# Patient Record
Sex: FEMALE | Race: OTHER | NOT HISPANIC OR LATINO | URBAN - METROPOLITAN AREA
[De-identification: names, ages, dates, MRNs, and addresses within clinical notes are randomized per-mention and may not be internally consistent; named-entity substitution may affect disease eponyms.]

---

## 2018-08-24 ENCOUNTER — EMERGENCY (EMERGENCY)
Facility: HOSPITAL | Age: 33
LOS: 1 days | Discharge: HOME | End: 2018-08-24
Attending: EMERGENCY MEDICINE
Payer: MEDICAID

## 2018-08-24 VITALS
SYSTOLIC BLOOD PRESSURE: 92 MMHG | DIASTOLIC BLOOD PRESSURE: 61 MMHG | RESPIRATION RATE: 16 BRPM | OXYGEN SATURATION: 98 % | HEIGHT: 67 IN | HEART RATE: 75 BPM | WEIGHT: 123.9 LBS | TEMPERATURE: 98 F

## 2018-08-24 LAB
APPEARANCE UR: CLEAR — SIGNIFICANT CHANGE UP
BILIRUB UR-MCNC: NEGATIVE — SIGNIFICANT CHANGE UP
COLOR SPEC: YELLOW — SIGNIFICANT CHANGE UP
DIFF PNL FLD: ABNORMAL
GLUCOSE UR QL: NEGATIVE — SIGNIFICANT CHANGE UP
HCG UR QL: NEGATIVE — SIGNIFICANT CHANGE UP
KETONES UR-MCNC: NEGATIVE — SIGNIFICANT CHANGE UP
LEUKOCYTE ESTERASE UR-ACNC: ABNORMAL
NITRITE UR-MCNC: POSITIVE
PH UR: 5 — SIGNIFICANT CHANGE UP (ref 5–8)
PROT UR-MCNC: 30 MG/DL
SP GR SPEC: 1.02 — SIGNIFICANT CHANGE UP (ref 1.01–1.02)
UROBILINOGEN FLD QL: NEGATIVE — SIGNIFICANT CHANGE UP

## 2018-08-24 PROCEDURE — 81001 URINALYSIS AUTO W/SCOPE: CPT

## 2018-08-24 PROCEDURE — 76830 TRANSVAGINAL US NON-OB: CPT

## 2018-08-24 PROCEDURE — 87086 URINE CULTURE/COLONY COUNT: CPT

## 2018-08-24 PROCEDURE — 76830 TRANSVAGINAL US NON-OB: CPT | Mod: 26

## 2018-08-24 PROCEDURE — 76856 US EXAM PELVIC COMPLETE: CPT | Mod: 26

## 2018-08-24 PROCEDURE — 81025 URINE PREGNANCY TEST: CPT

## 2018-08-24 PROCEDURE — 99284 EMERGENCY DEPT VISIT MOD MDM: CPT | Mod: 25

## 2018-08-24 PROCEDURE — 99284 EMERGENCY DEPT VISIT MOD MDM: CPT

## 2018-08-24 PROCEDURE — 76856 US EXAM PELVIC COMPLETE: CPT

## 2018-08-24 PROCEDURE — 87186 SC STD MICRODIL/AGAR DIL: CPT

## 2018-08-24 RX ORDER — CEPHALEXIN 500 MG
500 CAPSULE ORAL ONCE
Qty: 0 | Refills: 0 | Status: COMPLETED | OUTPATIENT
Start: 2018-08-24 | End: 2018-08-24

## 2018-08-24 RX ORDER — CEPHALEXIN 500 MG
1 CAPSULE ORAL
Qty: 10 | Refills: 0 | OUTPATIENT
Start: 2018-08-24 | End: 2018-08-28

## 2018-08-24 RX ADMIN — Medication 500 MILLIGRAM(S): at 15:00

## 2018-08-24 NOTE — ED PROVIDER NOTE - PROGRESS NOTE DETAILS
Alexis: pt asked about any GYn does, "cleaning of uterus" - pt states to clean out the blood.  I informed pt that it's no something done routinely unless indication ie- undesire pregnancy, fibroid removal, retain product of conception etc. Espinoza: ua + uti.  pt sono shows similar as pt stated.  left ovarian cyst Complex and thicken endometrium likely normal period.  dx uti.  f/u with gyn. rx keflex. left ambulatory.  return precautions given.

## 2018-08-24 NOTE — ED ADULT TRIAGE NOTE - CHIEF COMPLAINT QUOTE
C/o " I am bleeding since last Thursday, my IUD was removed, its bleeding less but today I noticed blood in my urine also and it smells".

## 2018-08-24 NOTE — ED PROVIDER NOTE - MEDICAL DECISION MAKING DETAILS
33 yr old female with no hx presents to ed c/o foul smelling urine and hematuria today. LMP 7/30/18, recently had IUD removed- resulted in vaginal bleed but improve on estrogen pill - pt concern for uti.    will check ua, urine pregnancy. no abd pain, n/v to suggest ovarian torsion. no concern for internal bleed without any trauma or any initiating factors.

## 2018-08-24 NOTE — ED ADULT NURSE NOTE - NSIMPLEMENTINTERV_GEN_ALL_ED
Implemented All Universal Safety Interventions:  [place] to call system. Call bell, personal items and telephone within reach. Instruct patient to call for assistance. Room bathroom lighting operational. Non-slip footwear when patient is off stretcher. Physically safe environment: no spills, clutter or unnecessary equipment. Stretcher in lowest position, wheels locked, appropriate side rails in place.

## 2018-08-24 NOTE — ED PROVIDER NOTE - OBJECTIVE STATEMENT
33 yr old female with no hx presents to ed c/o foul smelling urine and hematuria today.  pt went to her gyn and was concern for possible iud misplacement, vag bleed and had sono showing ovarian cyst.  but still not sure went to another ob and had IUD removed and told was due to ovarian cyst.  pt since having IUD 33 yr old female with no hx presents to ed c/o foul smelling urine and hematuria today.  pt went to her gyn and was concern for possible iud misplacement, vag bleed and had sono showing ovarian cyst and had her IUD removed.  Pt continue to have increase vaginal bleed and not sure went to another ob and had sono showing thicken endometrium and told possibly for malignancy.  wanted to do a biopsy.  went to another Ob and given hormone estrogen pills which since have normalized with minimal bleed.  vag bleed been going on 1 wk now.  no fever, no chills, no visual changes, no headache, no numbness or tingling, no sob, no cough, no cp, no palpitations, no leg swelling, no syncope, no abd pain, no n/v/d, no dysuria, no rashes. last LMP 7/30/18 33 yr old female with no hx presents to ed c/o foul smelling urine and hematuria today.  pt went to her gyn and was concern for possible iud misplacement, vag bleed and had sono showing ovarian cyst and had her IUD removed.  Pt continue to have increase vaginal bleed and not sure went to another ob and had sono showing thicken endometrium and told possibly for malignancy.  wanted to do a biopsy.  went to another Ob and given hormone estrogen pills which since have normalized with minimal bleed.  vag bleed been going on 1 wk now.  no fever, no chills, no visual changes, no headache, no numbness or tingling, no sob, no cough, no cp, no palpitations, no leg swelling, no syncope, no abd pain, no n/v/d, no dysuria, no rashes, no purulent vaginal discharge. last LMP 7/30/18.

## 2021-03-16 NOTE — ED ADULT TRIAGE NOTE - NS ED NURSE BANDS TYPE
[facility]   Outpatient Physical Therapy   Treatment Note  [] [address]  [x] Critical access hospital            of [address]  Date: 3/16/2021  Patient: Danis Love  : 2001  ACCT #: [de-identified]  Referring Practitioner: Marky Mar MD  Diagnosis: Upper back pain on left side    Visit Information:  PT Visit Information  PT Insurance Information: [employer]  Total # of Visits Approved: 30  Total # of Visits to Date: 6  No Show: 0  Canceled Appointment: 0  Progress Note Counter:     SUBJECTIVE:   Subjective  Subjective: pt reports some nerve pain with shooting sesation last night 8/10   HEP Compliance:  [x] Good [] Fair [] Poor [] Reports not doing due to:    PAIN   Location:   Pain Location: Back  Pain Rating (0-10 pain scale):  Pain Level: 4  Pain Description:  Pain Descriptors: Aching  Action:  [x] Acceptable for treatment  []  Other:    OBJECTIVE:   Exercises  Exercise 2: wall slides 10 sec x10 flexion, horizpntal abduction and abductoin  Exercise 3: posture ex x20  Exercise 4: pulleys x5 min  3 min flexion 2 min abduction  Exercise 5: rows and lats rTB x15  Exercise 6:  barrel stretch 3x30   Exercise 7: prone scap over ball 4 way x10  Exercise 8: door way stretch 3x20 sec   Exercise 9: chest pullls 3 way  YTB x10   Exercise 10: initiated standing triceps with 1 lb weight   Exercise 20: HEp kt removal     Manual: []  NA   Manual therapy  Soft Tissue Mobalization: tennis ball massage to left periscapular musculature  Other: kt lt scap paraspinal to inferior scap    Modalities: []  NA  Modalities  Moist heat: 10  min with IFC  E-stim (parameters): 10 min lt upper back. HEP  Continue with current Home Exercise Program.  See Objective section for progression of HEP. Comments:       POST-PAIN    Pain Rating (0-10 pain scale): 0/10  Location and Pain Description same as pre-pain unless otherwise indicated.   Action: [] NA  [] Call Physician  [x] Perform HEP  [] Meds as prescribed     ASSESSMENT:     Conditions Requiring Skilled Therapeutic Intervention  Assessment: Pt with good ricco to new exercises. Inititiaed shoulder strengthingin exercises. Tolerance to treatment:  [x] Good   [] Fair   [] Poor  [] Fatigued   [] Increased pain   Limited by:    Goals:     Short term goals  Time Frame for Short term goals: 4 weeks  Short term goal 1: Patient will report </= 1/10 pain in left upper back and scapula with work activities. Short term goal 2: Patient will be independent with HEP. Long term goals  Time Frame for Long term goals : 6 weeks  Long term goal 1: Patient will increase left shoulder ROM to [facility] for improved reach. Long term goal 2: Patient will increase strength in left shoulder and periscapulars >/= 1/2 manual muscle grade for improved tolerance. Long term goal 3: UEFI >/= 62/80 to demonstrate functional improvements. Progress toward goals: pain  Goals Met:    []  See updated POC   Comments:    PLAN:  [x] Continue POC to pt tolerance  [] Hold PT for ___ days.   See note to physician  [] Discharge PT    Signature:   Electronically signed by Bertha Martino PTA on 3/16/21 at 3:37 PM EDT    PT Individual Minutes  Time In: 1440  Time Out: 9297  Minutes: 55  Timed Code Treatment Minutes: 45 Minutes    Activity Minutes Units   Ther Ex 35 2   Manual   10 1    Estim 10 1 Name band;

## 2024-09-20 NOTE — ED ADULT TRIAGE NOTE - HEIGHT IN FEET
-- DO NOT REPLY / DO NOT REPLY ALL --  -- This inbox is not monitored. If this was sent to the wrong provider or department, reroute message to P Notchoute pool. --  -- Message is from [facility] [employer] ([facility]) --    General Patient Message: Called to request referral for Physical Therapy be faxed to our office at  [phone] patient's appointment is today at 1 pm.  Caller Information       Contact Date/Time Type Contact Phone/Fax    09/19/2024 04:20 PM CDT Phone (Incoming) [facility] [phone]     Nila from [facility]    09/20/2024 11:12 AM CDT Phone (Incoming) [facility] (Other) [phone]     [facility]            Alternative phone number: no    Can a detailed message be left? Yes - Voicemail   Patient has been advised the message will be addressed within 2-3 business days.                
Nila from [facility] calling to request a referral for patient for Physical Therapy for midline back.  Fax number is [phone].  All Nila with any questions.  
Pt order faxed.  
Pt was seen in ED 9/16/24 for chronic low back pain    OK to fax order for PT?  
5